# Patient Record
Sex: FEMALE | Race: WHITE | URBAN - METROPOLITAN AREA
[De-identification: names, ages, dates, MRNs, and addresses within clinical notes are randomized per-mention and may not be internally consistent; named-entity substitution may affect disease eponyms.]

---
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
## 2024-01-24 ENCOUNTER — OFFICE VISIT (OUTPATIENT)
Dept: FAMILY MEDICINE CLINIC | Age: 19
End: 2024-01-24

## 2024-01-24 VITALS
TEMPERATURE: 99.2 F | SYSTOLIC BLOOD PRESSURE: 110 MMHG | DIASTOLIC BLOOD PRESSURE: 70 MMHG | RESPIRATION RATE: 16 BRPM | BODY MASS INDEX: 27.72 KG/M2 | OXYGEN SATURATION: 98 % | WEIGHT: 162.4 LBS | HEART RATE: 105 BPM | HEIGHT: 64 IN

## 2024-01-24 DIAGNOSIS — N89.8 VAGINAL ITCHING: ICD-10-CM

## 2024-01-24 DIAGNOSIS — R30.0 DYSURIA: ICD-10-CM

## 2024-01-24 DIAGNOSIS — N30.00 ACUTE CYSTITIS WITHOUT HEMATURIA: Primary | ICD-10-CM

## 2024-01-24 LAB
BILIRUBIN, POC: 4
BLOOD URINE, POC: 50
CLARITY, POC: ABNORMAL
COLOR, POC: ABNORMAL
GLUCOSE URINE, POC: ABNORMAL
KETONES, POC: ABNORMAL
LEUKOCYTE EST, POC: 500
NITRITE, POC: POSITIVE
PH, POC: 6.5
PROTEIN, POC: 30
SPECIFIC GRAVITY, POC: 1.01
UROBILINOGEN, POC: 12

## 2024-01-24 PROCEDURE — 99214 OFFICE O/P EST MOD 30 MIN: CPT | Performed by: NURSE PRACTITIONER

## 2024-01-24 PROCEDURE — 81002 URINALYSIS NONAUTO W/O SCOPE: CPT | Performed by: NURSE PRACTITIONER

## 2024-01-24 RX ORDER — NITROFURANTOIN 25; 75 MG/1; MG/1
100 CAPSULE ORAL 2 TIMES DAILY
Qty: 14 CAPSULE | Refills: 0 | Status: SHIPPED | OUTPATIENT
Start: 2024-01-24 | End: 2024-01-31

## 2024-01-24 RX ORDER — FLUCONAZOLE 150 MG/1
150 TABLET ORAL
Qty: 2 TABLET | Refills: 0 | Status: SHIPPED | OUTPATIENT
Start: 2024-01-24 | End: 2024-01-30

## 2024-01-24 ASSESSMENT — PATIENT HEALTH QUESTIONNAIRE - PHQ9
SUM OF ALL RESPONSES TO PHQ QUESTIONS 1-9: 0
2. FEELING DOWN, DEPRESSED OR HOPELESS: 0
SUM OF ALL RESPONSES TO PHQ QUESTIONS 1-9: 0
1. LITTLE INTEREST OR PLEASURE IN DOING THINGS: 0
SUM OF ALL RESPONSES TO PHQ QUESTIONS 1-9: 0
SUM OF ALL RESPONSES TO PHQ QUESTIONS 1-9: 0
SUM OF ALL RESPONSES TO PHQ9 QUESTIONS 1 & 2: 0

## 2024-01-24 ASSESSMENT — ENCOUNTER SYMPTOMS
DIARRHEA: 0
NAUSEA: 0
ABDOMINAL PAIN: 0
CONSTIPATION: 1
RESPIRATORY NEGATIVE: 1
VOMITING: 0

## 2024-01-24 NOTE — PROGRESS NOTES
Audi Reyes   19 y.o.  female  [id number]      Chief Complaint   Patient presents with    Vaginitis     Onset: 3 days C/o itchiness, frequent urination, pelvic pressure. Has taken OTC Azo for sx with no full relief        Subjective:  19 y.o.female is here for an office visit. She has the following chronic/acute medical problems:There is no problem list on file for this patient.      HPI  Vaginal itching for the past few days.   Having some pressure with urination. Tried AZO for yeast over the counter with some relief of itchign symptoms.   Is sexually active but No concern for STIs    Review of Systems   Constitutional: Negative.    Respiratory: Negative.     Cardiovascular: Negative.    Gastrointestinal:  Positive for constipation. Negative for abdominal pain, diarrhea, nausea and vomiting.   Genitourinary:  Positive for dysuria and vaginal discharge. Negative for flank pain, frequency, hematuria and menstrual problem. Vaginal pain: itching.      Current Outpatient Medications   Medication Sig Dispense Refill    fluconazole (DIFLUCAN) 150 MG tablet Take 1 tablet by mouth every 72 hours for 6 days 2 tablet 0    nitrofurantoin, macrocrystal-monohydrate, (MACROBID) 100 MG capsule Take 1 capsule by mouth 2 times daily for 7 days 14 capsule 0    norgestimate-ethinyl estradiol (ORTHO-CYCLEN) 0.25-35 MG-MCG per tablet Take 1 tablet by mouth daily 30 tablet 11     No current facility-administered medications for this visit.        Past medical, family,surgical history reviewed    Objective:  /70 (Site: Left Upper Arm, Position: Sitting, Cuff Size: Medium Adult)   Pulse (!) 105   Temp 99.2 °F (37.3 °C) (Oral)   Resp 16   Ht 1.626 m (5' 4\")   Wt 73.7 kg (162 lb 6.4 oz)   LMP 01/03/2024 (Approximate)   SpO2 98%   BMI 27.88 kg/m²   BP Readings from Last 3 Encounters:   01/24/24 110/70   09/05/23 114/78   10/26/22 122/72     Wt Readings from Last 3 Encounters:   01/24/24 73.7 kg (162 lb 6.4 oz)   09/05/23

## 2024-01-25 DIAGNOSIS — N76.0 BV (BACTERIAL VAGINOSIS): Primary | ICD-10-CM

## 2024-01-25 DIAGNOSIS — B96.89 BV (BACTERIAL VAGINOSIS): Primary | ICD-10-CM

## 2024-01-25 LAB
CANDIDA DNA VAG QL NAA+PROBE: ABNORMAL
G VAGINALIS DNA SPEC QL NAA+PROBE: ABNORMAL
T VAGINALIS DNA VAG QL NAA+PROBE: ABNORMAL

## 2024-01-25 RX ORDER — METRONIDAZOLE 500 MG/1
500 TABLET ORAL 2 TIMES DAILY
Qty: 14 TABLET | Refills: 0 | Status: SHIPPED | OUTPATIENT
Start: 2024-01-25 | End: 2024-02-01

## 2024-01-27 LAB
BACTERIA UR CULT: ABNORMAL
ORGANISM: ABNORMAL

## 2024-03-18 ENCOUNTER — OFFICE VISIT (OUTPATIENT)
Dept: FAMILY MEDICINE CLINIC | Age: 19
End: 2024-03-18

## 2024-03-18 VITALS
DIASTOLIC BLOOD PRESSURE: 70 MMHG | WEIGHT: 160.4 LBS | TEMPERATURE: 98.1 F | RESPIRATION RATE: 16 BRPM | BODY MASS INDEX: 27.39 KG/M2 | OXYGEN SATURATION: 96 % | HEIGHT: 64 IN | SYSTOLIC BLOOD PRESSURE: 110 MMHG | HEART RATE: 126 BPM

## 2024-03-18 DIAGNOSIS — H66.002 ACUTE SUPPURATIVE OTITIS MEDIA OF LEFT EAR WITHOUT SPONTANEOUS RUPTURE OF TYMPANIC MEMBRANE, RECURRENCE NOT SPECIFIED: Primary | ICD-10-CM

## 2024-03-18 DIAGNOSIS — Z29.9 PROPHYLACTIC MEASURE: ICD-10-CM

## 2024-03-18 PROCEDURE — 99213 OFFICE O/P EST LOW 20 MIN: CPT | Performed by: NURSE PRACTITIONER

## 2024-03-18 RX ORDER — FLUCONAZOLE 150 MG/1
150 TABLET ORAL
Qty: 2 TABLET | Refills: 0 | Status: SHIPPED | OUTPATIENT
Start: 2024-03-18 | End: 2024-03-24

## 2024-03-18 RX ORDER — LIDOCAINE HYDROCHLORIDE 20 MG/ML
SOLUTION OROPHARYNGEAL
COMMUNITY
Start: 2024-03-17

## 2024-03-18 RX ORDER — AMOXICILLIN AND CLAVULANATE POTASSIUM 875; 125 MG/1; MG/1
1 TABLET, FILM COATED ORAL 2 TIMES DAILY
Qty: 14 TABLET | Refills: 0 | Status: SHIPPED | OUTPATIENT
Start: 2024-03-18 | End: 2024-03-25

## 2024-03-18 ASSESSMENT — ENCOUNTER SYMPTOMS
SORE THROAT: 1
WHEEZING: 0
GASTROINTESTINAL NEGATIVE: 1
COUGH: 1
STRIDOR: 0
RHINORRHEA: 1
SHORTNESS OF BREATH: 0
SINUS PRESSURE: 1

## 2024-03-18 NOTE — PROGRESS NOTES
Audi Reyes   19 y.o.  female  [id number]      Chief Complaint   Patient presents with    Pharyngitis     Onset: 3 days. C/o left ear pain and right ear clogging, stuffy nose, productive cough, HA, and neck stiffness. Seen at [facility] and prescribed Capmist. Negative for Covid, flu, and strep        Subjective:  19 y.o.female is here for an office visit. She has the following chronic/acute medical problems:There is no problem list on file for this patient.      HPI  Here with ear pain, burning pain in left ear. Right ear is muffled,   Throat is sore, congestion, pain in neck, went to  and was tested for covid flu and strep- all negative. Was given lidocaine for sore throat.   Taking ibuprofen at home with some relief.     Review of Systems   Constitutional:  Positive for chills and fatigue. Negative for diaphoresis and fever.   HENT:  Positive for congestion, ear pain, postnasal drip, rhinorrhea, sinus pressure and sore throat. Negative for ear discharge.    Eyes: Negative.    Respiratory:  Positive for cough. Negative for shortness of breath, wheezing and stridor.    Cardiovascular: Negative.    Gastrointestinal: Negative.    Musculoskeletal: Negative.    Neurological:  Positive for headaches.       Current Outpatient Medications   Medication Sig Dispense Refill    lidocaine viscous hcl (XYLOCAINE) 2 % SOLN solution       amoxicillin-clavulanate (AUGMENTIN) 875-125 MG per tablet Take 1 tablet by mouth 2 times daily for 7 days 14 tablet 0    fluconazole (DIFLUCAN) 150 MG tablet Take 1 tablet by mouth every 72 hours for 6 days 2 tablet 0    norgestimate-ethinyl estradiol (ORTHO-CYCLEN) 0.25-35 MG-MCG per tablet Take 1 tablet by mouth daily 30 tablet 11     No current facility-administered medications for this visit.        Past medical, family,surgical history reviewed    Objective:  /70 (Site: Left Upper Arm, Position: Sitting, Cuff Size: Medium Adult)   Pulse (!) 126   Temp 98.1 °F (36.7 °C) (Oral)   Resp

## 2024-03-20 ENCOUNTER — OFFICE VISIT (OUTPATIENT)
Dept: FAMILY MEDICINE CLINIC | Age: 19
End: 2024-03-20

## 2024-03-20 VITALS
OXYGEN SATURATION: 99 % | DIASTOLIC BLOOD PRESSURE: 76 MMHG | WEIGHT: 160 LBS | SYSTOLIC BLOOD PRESSURE: 116 MMHG | BODY MASS INDEX: 27.31 KG/M2 | HEIGHT: 64 IN | RESPIRATION RATE: 16 BRPM | HEART RATE: 100 BPM

## 2024-03-20 DIAGNOSIS — M79.675 PAIN OF TOE OF LEFT FOOT: ICD-10-CM

## 2024-03-20 DIAGNOSIS — L60.0 INGROWN NAIL OF GREAT TOE: Primary | ICD-10-CM

## 2024-03-20 PROCEDURE — 99213 OFFICE O/P EST LOW 20 MIN: CPT | Performed by: NURSE PRACTITIONER

## 2024-03-20 ASSESSMENT — ENCOUNTER SYMPTOMS
GASTROINTESTINAL NEGATIVE: 1
RESPIRATORY NEGATIVE: 1
BACK PAIN: 0

## 2024-03-20 NOTE — PROGRESS NOTES
Questions answered. Patient verbalizes understanding and agrees with plan.   After visit summary provided.   Advised to call for any problems, questions, or concerns.      No follow-ups on file.     Controlled Substances Monitoring:               Signed:  BEN Huff CNP  03/20/24  3:27 PM

## 2025-01-27 ENCOUNTER — OFFICE VISIT (OUTPATIENT)
Dept: FAMILY MEDICINE CLINIC | Age: 20
End: 2025-01-27

## 2025-01-27 VITALS
OXYGEN SATURATION: 96 % | TEMPERATURE: 98.4 F | DIASTOLIC BLOOD PRESSURE: 80 MMHG | WEIGHT: 164.8 LBS | HEART RATE: 125 BPM | BODY MASS INDEX: 28.13 KG/M2 | RESPIRATION RATE: 16 BRPM | SYSTOLIC BLOOD PRESSURE: 120 MMHG | HEIGHT: 64 IN

## 2025-01-27 DIAGNOSIS — R68.89 FLU-LIKE SYMPTOMS: Primary | ICD-10-CM

## 2025-01-27 DIAGNOSIS — J06.9 VIRAL URI: ICD-10-CM

## 2025-01-27 LAB
INFLUENZA A ANTIGEN, POC: NEGATIVE
INFLUENZA B ANTIGEN, POC: NEGATIVE
LOT EXPIRE DATE: NORMAL
LOT KIT NUMBER: NORMAL
S PYO AG THROAT QL: NORMAL
SARS-COV-2, POC: NORMAL
VALID INTERNAL CONTROL: NORMAL
VENDOR AND KIT NAME POC: NORMAL

## 2025-01-27 PROCEDURE — 87880 STREP A ASSAY W/OPTIC: CPT | Performed by: NURSE PRACTITIONER

## 2025-01-27 PROCEDURE — 99213 OFFICE O/P EST LOW 20 MIN: CPT | Performed by: NURSE PRACTITIONER

## 2025-01-27 PROCEDURE — 87428 SARSCOV & INF VIR A&B AG IA: CPT | Performed by: NURSE PRACTITIONER

## 2025-01-27 RX ORDER — DEXTROMETHORPHAN HYDROBROMIDE, GUAIFENESIN AND PSEUDOEPHEDRINE HYDROCHLORIDE 15; 400; 60 MG/1; MG/1; MG/1
1 TABLET ORAL 2 TIMES DAILY PRN
Qty: 20 TABLET | Refills: 0 | Status: SHIPPED | OUTPATIENT
Start: 2025-01-27 | End: 2025-02-06

## 2025-01-27 SDOH — ECONOMIC STABILITY: FOOD INSECURITY: WITHIN THE PAST 12 MONTHS, YOU WORRIED THAT YOUR FOOD WOULD RUN OUT BEFORE YOU GOT MONEY TO BUY MORE.: NEVER TRUE

## 2025-01-27 SDOH — ECONOMIC STABILITY: FOOD INSECURITY: WITHIN THE PAST 12 MONTHS, THE FOOD YOU BOUGHT JUST DIDN'T LAST AND YOU DIDN'T HAVE MONEY TO GET MORE.: NEVER TRUE

## 2025-01-27 ASSESSMENT — PATIENT HEALTH QUESTIONNAIRE - PHQ9
2. FEELING DOWN, DEPRESSED OR HOPELESS: NOT AT ALL
SUM OF ALL RESPONSES TO PHQ QUESTIONS 1-9: 0
1. LITTLE INTEREST OR PLEASURE IN DOING THINGS: NOT AT ALL
SUM OF ALL RESPONSES TO PHQ9 QUESTIONS 1 & 2: 0

## 2025-01-27 NOTE — PROGRESS NOTES
1/27/2025    Audi Reyes    Chief Complaint   Patient presents with    Pharyngitis     C/o runny nose, laryngitis, and sore throat x 1 day. Taking Mucinex with last dose taken yesterday       HPI  History of Present Illness  The patient is a 20-year-old individual who presents for evaluation of sore throat.    Sore Throat and Associated Symptoms  They began experiencing symptoms of illness yesterday, characterized by a sudden onset of a severe sore throat in the morning, which progressively worsened throughout the day. They report no fever but do have sweats, chills and note an increase in chest heaviness upon awakening this morning. Their sore throat persists, accompanied by a runny nose and further deterioration of their voice. They also report excessive sweating and a productive cough, although the volume of sputum is minimal. 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  - Onset: Symptoms began yesterday with a sudden onset of a severe sore throat in the morning.  - Location: Throat, chest.  - Duration: Symptoms have been present since yesterday.  - Character: Severe sore throat, chest heaviness, runny nose, voice deterioration, excessive sweating, productive cough with minimal sputum.  - Timing: Sore throat worsened throughout the day; chest heaviness noted upon awakening this morning.  - Severity: Severe sore throat, minimal sputum production with cough.        REVIEW OF SYMPTOMS    Review of Systems see damari          1/27/2025    10:16 AM 1/24/2024     3:05 PM 9/5/2023     9:38 AM 10/26/2022    11:45 AM   PHQ Scores   PHQ2 Score 0 0 0 0   PHQ9 Score 0 0 0 0     Interpretation of Total Score Depression Severity: 1-4 = Minimal depression, 5-9 = Mild depression, 10-14 = Moderate depression, 15-19 = Moderately severe depression, 20-27 = Severe depression          No data to display              Interpretation of BRUNO-7 score: 5-9 = mild anxiety, 10-14 = moderate

## 2025-03-03 ENCOUNTER — OFFICE VISIT (OUTPATIENT)
Dept: FAMILY MEDICINE CLINIC | Age: 20
End: 2025-03-03

## 2025-03-03 VITALS
RESPIRATION RATE: 16 BRPM | BODY MASS INDEX: 25.61 KG/M2 | HEIGHT: 64 IN | OXYGEN SATURATION: 99 % | HEART RATE: 111 BPM | WEIGHT: 150 LBS | SYSTOLIC BLOOD PRESSURE: 128 MMHG | DIASTOLIC BLOOD PRESSURE: 88 MMHG | TEMPERATURE: 98.7 F

## 2025-03-03 DIAGNOSIS — R52 BODY ACHES: ICD-10-CM

## 2025-03-03 DIAGNOSIS — J06.9 VIRAL URI: Primary | ICD-10-CM

## 2025-03-03 PROCEDURE — 99213 OFFICE O/P EST LOW 20 MIN: CPT | Performed by: NURSE PRACTITIONER

## 2025-03-03 RX ORDER — IBUPROFEN 600 MG/1
600 TABLET, FILM COATED ORAL EVERY 6 HOURS PRN
Qty: 30 TABLET | Refills: 0 | Status: SHIPPED | OUTPATIENT
Start: 2025-03-03

## 2025-03-03 RX ORDER — DEXTROMETHORPHAN HYDROBROMIDE, GUAIFENESIN AND PSEUDOEPHEDRINE HYDROCHLORIDE 15; 400; 60 MG/1; MG/1; MG/1
1 TABLET ORAL 2 TIMES DAILY PRN
Qty: 20 TABLET | Refills: 0 | Status: SHIPPED | OUTPATIENT
Start: 2025-03-03 | End: 2025-03-13

## 2025-03-03 ASSESSMENT — ENCOUNTER SYMPTOMS
WHEEZING: 0
COUGH: 0
SINUS PAIN: 1
SORE THROAT: 1
NAUSEA: 1
DIARRHEA: 0
VOMITING: 0
SHORTNESS OF BREATH: 0
ABDOMINAL PAIN: 1
RHINORRHEA: 1

## 2025-03-03 NOTE — PROGRESS NOTES
for: \"NA\", \"K\", \"CL\", \"CO2\", \"BUN\", \"CREATININE\", \"GLUCOSE\", \"CALCIUM\", \"LABALBU\", \"BILITOT\", \"ALKPHOS\", \"AST\", \"ALT\", \"LABGLOM\", \"GFRAA\", \"AGRATIO\", \"GLOB\"  No results found for: \"CHOL\"  No results found for: \"TRIG\"  No results found for: \"HDL\"  No components found for: \"LDLCALC\", \"LDLCHOLESTEROL\"  No results found for: \"LABA1C\"  No results found for: \"TSHFT4\", \"TSH\", \"TSHHS\"      ASSESSMENT/PLAN:      1. Viral URI    Increase fluids and rest  Saline nasal spray as needed for nasal congestion  Warm salt water gargles as needed for throat discomfort  Can use hot tea with lemon/honey for cough and sore throat.  Tylenol as needed for fevers and/or discomfort.   CAPMIST sent to pharmacy.      FU PRN      2. Body aches    - ibuprofen (ADVIL;MOTRIN) 600 MG tablet; Take 1 tablet by mouth every 6 hours as needed for Pain  Dispense: 30 tablet; Refill: 0        Orders Placed This Encounter   Medications    ibuprofen (ADVIL;MOTRIN) 600 MG tablet     Sig: Take 1 tablet by mouth every 6 hours as needed for Pain     Dispense:  30 tablet     Refill:  0    Pseudoephedrine-DM-GG (CAPMIST DM) 60- MG TABS     Sig: Take 1 tablet by mouth 2 times daily as needed (cough or congestion)     Dispense:  20 tablet     Refill:  0     Medications Discontinued During This Encounter   Medication Reason    lidocaine viscous hcl (XYLOCAINE) 2 % SOLN solution LIST CLEANUP       No orders of the defined types were placed in this encounter.      Care discussed with patient. Questions answered. Patient verbalizes understanding and agrees with plan.   After visit summary provided.   Advised to call for any problems, questions, or concerns.      Return if symptoms worsen or fail to improve, for [facility].     Controlled Substances Monitoring: (if applicable to pt visit)                                         Signed:  BEN Huff - CNP  03/04/25  9:21 AM

## 2025-03-10 ENCOUNTER — OFFICE VISIT (OUTPATIENT)
Dept: FAMILY MEDICINE CLINIC | Age: 20
End: 2025-03-10

## 2025-03-10 ENCOUNTER — TELEPHONE (OUTPATIENT)
Dept: FAMILY MEDICINE CLINIC | Age: 20
End: 2025-03-10

## 2025-03-10 VITALS
OXYGEN SATURATION: 96 % | SYSTOLIC BLOOD PRESSURE: 108 MMHG | HEART RATE: 106 BPM | BODY MASS INDEX: 25.3 KG/M2 | RESPIRATION RATE: 16 BRPM | DIASTOLIC BLOOD PRESSURE: 70 MMHG | TEMPERATURE: 98.4 F | WEIGHT: 148.2 LBS | HEIGHT: 64 IN

## 2025-03-10 DIAGNOSIS — J01.00 ACUTE NON-RECURRENT MAXILLARY SINUSITIS: Primary | ICD-10-CM

## 2025-03-10 DIAGNOSIS — Z29.9 PROPHYLACTIC MEASURE: ICD-10-CM

## 2025-03-10 DIAGNOSIS — R11.2 NAUSEA AND VOMITING, UNSPECIFIED VOMITING TYPE: ICD-10-CM

## 2025-03-10 PROCEDURE — 99213 OFFICE O/P EST LOW 20 MIN: CPT | Performed by: NURSE PRACTITIONER

## 2025-03-10 RX ORDER — IPRATROPIUM BROMIDE 42 UG/1
2 SPRAY, METERED NASAL 3 TIMES DAILY
COMMUNITY
Start: 2025-03-07

## 2025-03-10 RX ORDER — AZITHROMYCIN 250 MG/1
TABLET, FILM COATED ORAL
Qty: 6 TABLET | Refills: 0 | Status: SHIPPED | OUTPATIENT
Start: 2025-03-10 | End: 2025-03-20

## 2025-03-10 RX ORDER — FLUCONAZOLE 150 MG/1
TABLET ORAL
Qty: 2 TABLET | Refills: 0 | Status: SHIPPED | OUTPATIENT
Start: 2025-03-10

## 2025-03-10 RX ORDER — BENZONATATE 200 MG/1
200 CAPSULE ORAL 3 TIMES DAILY PRN
COMMUNITY
Start: 2025-03-07

## 2025-03-10 RX ORDER — OFLOXACIN 3 MG/ML
2 SOLUTION/ DROPS OPHTHALMIC 4 TIMES DAILY
COMMUNITY
Start: 2025-03-07

## 2025-03-10 RX ORDER — ONDANSETRON 4 MG/1
4 TABLET, ORALLY DISINTEGRATING ORAL 3 TIMES DAILY PRN
Qty: 21 TABLET | Refills: 0 | Status: SHIPPED | OUTPATIENT
Start: 2025-03-10

## 2025-03-10 NOTE — TELEPHONE ENCOUNTER
Patient called and states she started vomiting aprox one hour after taking amoxicillin this morning and hasn't stopped. She did eat something with medication as directed.   Please advise.

## 2025-03-10 NOTE — TELEPHONE ENCOUNTER
Patient has been advised to stop previously prescribed medication and informed of  new medication and anti- emetic along with instructions on taking the medication. Expressed verbal understanding

## 2025-03-10 NOTE — PROGRESS NOTES
3/10/2025    Audi Reyes    Chief Complaint   Patient presents with    Conjunctivitis     C/o pink eye of the bilateral eye    Cold Symptoms     C/o continued sinus pressure, cough, sinus HA        HPI  History of Present Illness  The patient presents for evaluation of cough, viral conjunctivitis, and sore throat.    Cough and Mucus Production  They sought medical attention at an urgent care facility due to a persistent cough and mucus production. They were prescribed medication to alleviate these symptoms. They report a persistent cough, although their congestion has improved. They also report mild ear discomfort and an unusual stool consistency, describing it as mucous-like. They are not experiencing any nausea, vomiting, or diarrhea.  - Onset: Persistent.  - Location: Respiratory system, mild ear discomfort.  - Character: Persistent cough, mucus production, thin and mucous-like stool consistency.  - Alleviating/Aggravating Factors: Medication prescribed to alleviate symptoms.  - Severity: Persistent cough, improved congestion, mild ear discomfort.    Viral Conjunctivitis  During their visit to the urgent care, they were diagnosed with viral conjunctivitis. Initially affecting one eye, the condition has since spread to both eyes. They express concern about the potential contagiousness of their condition. They do not use contact lenses and report a greenish-yellow discharge from their eyes. They were prescribed ofloxacin eye drops.  - Onset: Initially affected one eye, now spread to both eyes.  - Location: Eyes.  - Character: Greenish-yellow discharge.  - Alleviating/Aggravating Factors: Ofloxacin eye drops prescribed at .  - Severity: Concern about contagiousness.    Sore Throat  They report a mild sore throat, which worsens as the day progresses. .  - Onset: Worsens as the day progresses.  - Character: Mild sore throat.  - Timing: Worsens as the day progresses.    ALLERGIES  The patient has no known